# Patient Record
(demographics unavailable — no encounter records)

---

## 2024-12-17 NOTE — ASSESSMENT
[FreeTextEntry1] : Right ear and left ear impedances WNL Left ear and right ear data logging 14 hours  Left ear-based on complaints tried a 900Hz and a 500Hz Population Mean map and settled on a 500Hz Population Mean map: P1 New 500Hz 8 max PW 25 Population Mean map  P2 Same as P1 with SCAN  Right Ear No changes made to maps today since focused on the left ear but added SCAN to P@  Paired N7 processors to Iphone. Reviewed streaming phone calls and music. Reviewed how to stream to Left ear to use Cochlear [employer] for Aural Re[employer]. Reviewed [employer] Mike Reviewed Mini Fortino Suggested purchase of phone clip for TV Gave Karmen's contact for additional support.  Suggested 30 day trial with SCAN